# Patient Record
Sex: MALE | Race: WHITE | NOT HISPANIC OR LATINO
[De-identification: names, ages, dates, MRNs, and addresses within clinical notes are randomized per-mention and may not be internally consistent; named-entity substitution may affect disease eponyms.]

---

## 2020-05-13 ENCOUNTER — TRANSCRIPTION ENCOUNTER (OUTPATIENT)
Age: 25
End: 2020-05-13

## 2021-03-22 ENCOUNTER — TRANSCRIPTION ENCOUNTER (OUTPATIENT)
Age: 26
End: 2021-03-22

## 2021-12-17 PROBLEM — Z00.00 ENCOUNTER FOR PREVENTIVE HEALTH EXAMINATION: Status: ACTIVE | Noted: 2021-12-17

## 2021-12-22 ENCOUNTER — APPOINTMENT (OUTPATIENT)
Dept: NEUROLOGY | Facility: CLINIC | Age: 26
End: 2021-12-22
Payer: COMMERCIAL

## 2021-12-22 VITALS
OXYGEN SATURATION: 99 % | SYSTOLIC BLOOD PRESSURE: 124 MMHG | TEMPERATURE: 97.6 F | DIASTOLIC BLOOD PRESSURE: 84 MMHG | HEIGHT: 73 IN | WEIGHT: 183 LBS | HEART RATE: 81 BPM | BODY MASS INDEX: 24.25 KG/M2

## 2021-12-22 DIAGNOSIS — F98.8 OTHER SPECIFIED BEHAVIORAL AND EMOTIONAL DISORDERS WITH ONSET USUALLY OCCURRING IN CHILDHOOD AND ADOLESCENCE: ICD-10-CM

## 2021-12-22 DIAGNOSIS — F41.9 ANXIETY DISORDER, UNSPECIFIED: ICD-10-CM

## 2021-12-22 DIAGNOSIS — M21.339 WRIST DROP, UNSPECIFIED WRIST: ICD-10-CM

## 2021-12-22 DIAGNOSIS — Z72.89 OTHER PROBLEMS RELATED TO LIFESTYLE: ICD-10-CM

## 2021-12-22 DIAGNOSIS — F17.290 NICOTINE DEPENDENCE, OTHER TOBACCO PRODUCT, UNCOMPLICATED: ICD-10-CM

## 2021-12-22 PROCEDURE — 99204 OFFICE O/P NEW MOD 45 MIN: CPT

## 2021-12-22 RX ORDER — DEXTROAMPHETAMINE SACCHARATE, AMPHETAMINE ASPARTATE, DEXTROAMPHETAMINE SULFATE, AND AMPHETAMINE SULFATE 5; 5; 5; 5 MG/1; MG/1; MG/1; MG/1
20 TABLET ORAL
Refills: 0 | Status: ACTIVE | COMMUNITY

## 2021-12-22 RX ORDER — ESCITALOPRAM OXALATE 5 MG/1
5 TABLET, FILM COATED ORAL
Refills: 0 | Status: ACTIVE | COMMUNITY

## 2021-12-23 LAB
ALBUMIN SERPL ELPH-MCNC: 4.7 G/DL
ALP BLD-CCNC: 99 U/L
ALT SERPL-CCNC: 66 U/L
ANION GAP SERPL CALC-SCNC: 12 MMOL/L
AST SERPL-CCNC: 72 U/L
BASOPHILS # BLD AUTO: 0.06 K/UL
BASOPHILS NFR BLD AUTO: 1 %
BILIRUB SERPL-MCNC: <0.2 MG/DL
BUN SERPL-MCNC: 7 MG/DL
CALCIUM SERPL-MCNC: 9.2 MG/DL
CHLORIDE SERPL-SCNC: 103 MMOL/L
CO2 SERPL-SCNC: 25 MMOL/L
CREAT SERPL-MCNC: 0.8 MG/DL
EOSINOPHIL # BLD AUTO: 0.36 K/UL
EOSINOPHIL NFR BLD AUTO: 6.3 %
FOLATE SERPL-MCNC: 9.6 NG/ML
GLUCOSE SERPL-MCNC: 68 MG/DL
HCT VFR BLD CALC: 47.9 %
HGB BLD-MCNC: 15.4 G/DL
IMM GRANULOCYTES NFR BLD AUTO: 0.7 %
LYMPHOCYTES # BLD AUTO: 1.11 K/UL
LYMPHOCYTES NFR BLD AUTO: 19.3 %
MAN DIFF?: NORMAL
MCHC RBC-ENTMCNC: 32.2 GM/DL
MCHC RBC-ENTMCNC: 33.2 PG
MCV RBC AUTO: 103.2 FL
MONOCYTES # BLD AUTO: 0.68 K/UL
MONOCYTES NFR BLD AUTO: 11.8 %
NEUTROPHILS # BLD AUTO: 3.5 K/UL
NEUTROPHILS NFR BLD AUTO: 60.9 %
PLATELET # BLD AUTO: 272 K/UL
POTASSIUM SERPL-SCNC: 4.2 MMOL/L
PROT SERPL-MCNC: 7.1 G/DL
RBC # BLD: 4.64 M/UL
RBC # FLD: 12 %
SODIUM SERPL-SCNC: 141 MMOL/L
VIT B12 SERPL-MCNC: 495 PG/ML
WBC # FLD AUTO: 5.75 K/UL

## 2021-12-23 NOTE — PHYSICAL EXAM
[FreeTextEntry1] : Physical examination \par General: No acute distress, Awake, Alert. \par \par Mental status \par Awake, alert, and oriented to person, time and place, Normal attention span and concentration, Recent and remote memory intact, Language intact, Fund of knowledge intact. \par Cranial Nerves \par II: VFF \par III, IV, VI: PERRL, EOMI. \par V: Facial sensation is normal B/L. \par VII: Facial strength is normal B/L. \par VIII: Gross hearing is intact. \par IX, X: Palate is midline and elevates symmetrically. \par XI: Trapezius normal strength. \par XII: Tongue midline without atrophy or fasciculations. \par \par Motor exam \par Muscle tone - no evidence of rigidity or resistance in all 4 extremities. \par No atrophy or fasciculations \par Muscle Strength: arms and legs, proximal and distal flexors and extensors are normal EXCEPT right wrist extensor weakness, finger extensor weakness noted as well leading to dec  \par \par No UE drift.\par \par Reflexes \par All present, normal, and symmetrical. \par Plantars right: mute. \par Plantars left: mute. \par \par Coordination \par Finger to nose: Normal. \par Heel to shin: Normal. \par \par Sensory \par Intact sensation to vibration and cold.\par \par Gait \par Normal\par

## 2021-12-23 NOTE — ASSESSMENT
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

## 2021-12-23 NOTE — HISTORY OF PRESENT ILLNESS
[FreeTextEntry1] : This is a 25 y/o man who is being seen in consultation for right wrist drop and feet numbness.\par Drinks 4-6 beers 4-5 times a week.\par \par Mid-October woke up with right wrist drop\par Had been drinking and passed out on the chair\par Went to ED at [facility]. \par MRI Cervical spine and brachial plexus- normal.\par \par Regained some function - no pain\par didn't do therapy\par had it in a splint (given by ED)\par \par Also notes bilateral foot numbness.\par \par Started 6 months to one year ago\par New onset numbness in toes (right toe 4/5)- now involving entire foot \par Notes Left toe numbness\par No low back pain\par No loss of strength\par No balance issues\par No severe pain in feet\par lack of sensation but not painful\par \par Father has gluten sensitivity.

## 2021-12-24 LAB
TTG IGA SER IA-ACNC: <1.2 U/ML
TTG IGA SER-ACNC: NEGATIVE
TTG IGG SER IA-ACNC: <1.2 U/ML
TTG IGG SER IA-ACNC: NEGATIVE

## 2022-01-04 LAB — VIT B1 SERPL-MCNC: 130.2 NMOL/L

## 2022-04-28 ENCOUNTER — APPOINTMENT (OUTPATIENT)
Dept: NEUROLOGY | Facility: CLINIC | Age: 27
End: 2022-04-28
Payer: COMMERCIAL

## 2022-04-28 DIAGNOSIS — R94.131 ABNORMAL ELECTROMYOGRAM [EMG]: ICD-10-CM

## 2022-04-28 DIAGNOSIS — R20.0 ANESTHESIA OF SKIN: ICD-10-CM

## 2022-04-28 DIAGNOSIS — M54.16 RADICULOPATHY, LUMBAR REGION: ICD-10-CM

## 2022-04-28 PROCEDURE — 95910 NRV CNDJ TEST 7-8 STUDIES: CPT

## 2022-04-28 PROCEDURE — 95886 MUSC TEST DONE W/N TEST COMP: CPT

## 2022-04-28 PROCEDURE — 99214 OFFICE O/P EST MOD 30 MIN: CPT | Mod: 25

## 2022-04-28 NOTE — CONSULT LETTER
[Dear  ___] : Dear  [unfilled], [Consult Letter:] : I had the pleasure of evaluating your patient, [unfilled]. [Please see my note below.] : Please see my note below. [FreeTextEntry3] : Sincerely,\par \par Milton Guevara M.D.\par

## 2022-04-28 NOTE — ASSESSMENT
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 declined off to help with inpatient detox- wants to do on own.\par \par

## 2022-04-28 NOTE — HISTORY OF PRESENT ILLNESS
[FreeTextEntry1] : Doing well overall.\par Drinking the same amount.\par No low back pain, no neck pain.\par \par Right wrist back to normal.\par \par Recalls right foot fracture during covid- was in a boot -\par treated in  and orthopaedic surgery\par \par \par 12/22/21\par This is a 27 y/o man who is being seen in consultation for right wrist drop and feet numbness.\par Drinks 4-6 beers 4-5 times a week.\par \par Mid-October woke up with right wrist drop\par Had been drinking and passed out on the chair\par Went to ED at [facility]. \par MRI Cervical spine and brachial plexus- normal.\par \par Regained some function - no pain\par didn't do therapy\par had it in a splint (given by ED)\par \par Also notes bilateral foot numbness.\par \par Started 6 months to one year ago\par New onset numbness in toes (right toe 4/5)- now involving entire foot \par Notes Left toe numbness\par No low back pain\par No loss of strength\par No balance issues\par No severe pain in feet\par lack of sensation but not painful\par \par Father has gluten sensitivity.

## 2022-04-28 NOTE — HISTORY OF PRESENT ILLNESS
[FreeTextEntry1] : Doing well overall.\par Drinking the same amount.\par No low back pain, no neck pain.\par \par Right wrist back to normal.\par \par Recalls right foot fracture during covid- was in a boot -\par treated in  and orthopaedic surgery\par \par \par 12/22/21\par This is a 27 y/o man who is being seen in consultation for right wrist drop and feet numbness.\par Drinks 4-6 beers 4-5 times a week.\par \par Mid-October woke up with right wrist drop\par Had been drinking and passed out on the chair\par Went to ED at [facility]. \par MRI Cervical spine and brachial plexus- normal.\par \par Regained some function - no pain\par didn't do therapy\par had it in a splint (given by ED)\par \par Also notes bilateral foot numbness.\par \par Started 6 months to one year ago\par New onset numbness in toes (right toe 4/5)- now involving entire foot \par Notes Left toe numbness\par No low back pain\par No loss of strength\par No balance issues\par No severe pain in feet\par lack of sensation but not painful\par \par Father has gluten sensitivity.

## 2022-05-26 ENCOUNTER — RESULT REVIEW (OUTPATIENT)
Age: 27
End: 2022-05-26

## 2022-06-05 ENCOUNTER — NON-APPOINTMENT (OUTPATIENT)
Age: 27
End: 2022-06-05

## 2022-06-13 ENCOUNTER — NON-APPOINTMENT (OUTPATIENT)
Age: 27
End: 2022-06-13

## 2022-06-13 ENCOUNTER — APPOINTMENT (OUTPATIENT)
Dept: NEUROSURGERY | Facility: CLINIC | Age: 27
End: 2022-06-13
Payer: COMMERCIAL

## 2022-06-13 VITALS
HEIGHT: 73 IN | HEART RATE: 94 BPM | SYSTOLIC BLOOD PRESSURE: 120 MMHG | DIASTOLIC BLOOD PRESSURE: 93 MMHG | TEMPERATURE: 98.7 F | OXYGEN SATURATION: 98 % | BODY MASS INDEX: 24.52 KG/M2 | WEIGHT: 185 LBS

## 2022-06-13 DIAGNOSIS — R93.89 ABNORMAL FINDINGS ON DIAGNOSTIC IMAGING OF OTHER SPECIFIED BODY STRUCTURES: ICD-10-CM

## 2022-06-13 PROCEDURE — 99205 OFFICE O/P NEW HI 60 MIN: CPT

## 2022-11-16 NOTE — END OF VISIT
[Time Spent: ___ minutes] : I have spent [unfilled] minutes of time on the encounter. [>50% of the face to face encounter time was spent on counseling and/or coordination of care for ___] : Greater than 50% of the face to face encounter time was spent on counseling and/or coordination of care for [unfilled] [FreeTextEntry3] : I have seen the patient and reviewed the case together with PA and I agree with the final recommendations and plan of care.\par \par Drake Minor MD\par Neurosurgery\par \par

## 2022-11-16 NOTE — PHYSICAL EXAM
[Sensation Tactile Decrease] : light touch was intact [Past-pointing] : there was no past-pointing [Tremor] : no tremor present [FreeTextEntry7] : dec sensation bilat feet

## 2022-11-16 NOTE — HISTORY OF PRESENT ILLNESS
[de-identified] : CAROLINA DORSEY is a 26 year male with a PMH of EtOH use, Anxiety, ADD, recent R radial wrist drop (resolved) who presents to the office today for neurosurgical consultation at the request of JANUSZ Holly due to left frontal cystic brain lesion found on recent outpatient work up of bilateral foot numbness.  The patient reports bilateral foot numbness that started about 1 year ago.  He denies headaches, weakness, numbness, gait abnormality, imbalance, visual change, speech disturbance.  He underwent MRI L spine which was unremarkable and LE EMG which showed prolonged decreased peroneal nerve response (prior foot fracture).  MRI brain was done which showed small 0.8cm left frontal operculum non-enhancing cystic lesion. MRI Cspine/brachial plexus-normal. B12/Folate levels were normal. \par Meds: Adderal, lexapro\par Allergies: NKDA\par Soc Hx: drinks 4-5 drinks 5 days a week, +vaping, \par Surg Hx: rhinoplasty\par  \par

## 2022-11-16 NOTE — ASSESSMENT
[FreeTextEntry1] : I have discussed the natural history and treatment options for cystic brain lesions with the patient. I explained the indications for observation and imaging surveillance, medical management, and surgery. I discussed the risks, benefits, possible complications and expected outcome related to each treatment option.\par \par I am favoring arachnoid cyst as the most likely diagnosis given the imaging findings.  In the end, I recommend imaging surveillance in 6 month's time with MRI +/- brain Rogers protocol prior to visit to follow for progression of the lesion.  \par \par The patient understands the plan of care and is in agreement.  All questions answered to patient satisfaction.\par \par \par

## 2022-11-16 NOTE — DATA REVIEWED
[de-identified] : \par  [facility] MRI Report             Amended\par \par No Documents Attached\par \par \par \par \par   [facility]\par                                          [address]\par                                    [place], [place]  [address]\par                                        Department of Radiology\par                                             [phone]\par \par \par Patient Name:      CAROLINA DORSEY                Location:       [facility]\par Med Rec #:        [id number]                    Account #:      [id number]\par YOB: 1995                    Ordering:       Brigitte Guevara MD\par Age: 26               Sex:    M                 Attending:      Brigitte Guevara MD\par PCP:        Judd Souza MD\par ______________________________________________________________________________________\par \par Exam Date:      05/26/22\par Exam:         MRI BRAIN [facility] IC\par Order#:       MRI [id number]\par \par \par \par       **************A D D E N D U M**************\par Addendum: Following the administration of 7 cc of Gadavist, sagittal axial and coronal reformatted\par T1 MP rage images were obtained.\par \par  There is no evidence of abnormal enhancement within the brain parenchyma. No evidence of abnormal\par enhancement within the 0.8 cm cystic lesion with mild peripheral FLAIR and T2 signal hyperintensity\par in the left frontal operculum. Findings may represent an infectious/inflammatory process however\par primary cystic brain neoplasm such as low-grade glioma cannot be excluded. Recommend correlation\par with the patient's history and if clinically indicated follow-up MRI with contrast to assess for\par stability.\par \par  --- End of Report ---\par \par ***Electronically Signed ***\par -----------------------------------------------\par Kellie Gonzalez MD        06/01/22\par \par \par Signed on 06/01/22 1733\par Edited by: Kellie Gonzalez MD\par \par \par Bilateral numbness of the feet. Abnormal EMG.\par \par  Technique: MRI of the brain was performed utilizing sagittal, coronal and axial reformatted T1 MP\par rage, axial T2, axial gradient echo, axial, sagittal and coronal reformatted 3-D FLAIR and diffusion\par imaging.\par \par  There are no prior studies available for comparison.\par \par  Findings: There is an abnormal ovoid focus of T1 and FLAIR signal hypointensity and T2 signal\par hyperintensity measuring approximately 0.8 cm x 0.6 cm x 0.5 cm cranial caudal within the left\par inferior frontal opercular cortical/subcortical white matter (image #15 series 30). There is\par peripheral surrounding T2 and FLAIR signal hyperintensity.\par \par  There is no evidence of mass-effect or midline shift. There is no evidence of  extra-axial fluid\par collection. The ventricles are of normal size and caliber. There is no evidence of acute infarction.\par Evaluation of the intracranial vascular flow-voids appear within normal limits.\par \par  Gradient echo images demonstrate no evidence of abnormal susceptibility changes.\par \par  The visualized paranasal sinuses and bilateral mastoid air cells are clear.\par \par \par  Impression: Approximately 0.8 cm abnormal ovoid cystic focus with mild peripheral surrounding T2\par and FLAIR signal hyperintensity likely edema within the left inferior frontal opercular\par cortical/subcortical white matter. Findings may represent an infectious/inflammatory process\par including neurocysticercosis however cystic neoplastic processes cannot be excluded. Recommend MRI\par with contrast for further characterization. No evidence of acute infarction.\par \par  --- End of Report ---\par \par ***Electronically Signed ***\par -----------------------------------------------\par Kellie Gonzalez MD              05/26/22 1637\par \par Dictated on 05/26/22\par \par \par Report cc:  Brigitte Guevara MD;\par \par  \par \par  Ordered by: BRIGITTE GUEVARA       Collected/Examined: 26May2022 07:25AM       \par Verification Required       Stage: Amended       \par  Performed at: [facility]       Resulted: [id number] 04:37PM       Last Updated: 01Jun2022 05:37PM       Accession: [id number]

## 2022-12-16 ENCOUNTER — RESULT REVIEW (OUTPATIENT)
Age: 27
End: 2022-12-16

## 2024-01-29 ENCOUNTER — RESULT REVIEW (OUTPATIENT)
Age: 29
End: 2024-01-29

## 2024-02-07 ENCOUNTER — APPOINTMENT (OUTPATIENT)
Dept: NEUROSURGERY | Facility: CLINIC | Age: 29
End: 2024-02-07
Payer: COMMERCIAL

## 2024-02-07 VITALS
SYSTOLIC BLOOD PRESSURE: 130 MMHG | HEIGHT: 73 IN | DIASTOLIC BLOOD PRESSURE: 92 MMHG | HEART RATE: 93 BPM | OXYGEN SATURATION: 97 %

## 2024-02-07 DIAGNOSIS — G93.0 CEREBRAL CYSTS: ICD-10-CM

## 2024-02-07 PROCEDURE — 99215 OFFICE O/P EST HI 40 MIN: CPT

## 2024-02-07 NOTE — REASON FOR VISIT
[FreeTextEntry1] : Mr. Dorsey returns today for interval follow up and imaging review of his left frontal cyst noted on imaging work up of bilateral foot numbness.  The patient denies headaches, visual change, numbness, weakness of extremities, speech disturbance, dizziness, neck pain, vertigo.  MRI brain +/- performed on 1/29/24 and independently reviewed by me today reveals stability of 5 x 7 x 6mm T2 hyperintense, non enhancing left frontal cystic structure. Patient has no new complaints.    6/13/22: CAROLINA DORSEY is a 26 year male with a PMH of EtOH use, Anxiety, ADD, recent R radial wrist drop (resolved) who presents to the office today for neurosurgical consultation at the request of JANUSZ Holly due to left frontal cystic brain lesion found on recent outpatient work up of bilateral foot numbness. The patient reports bilateral foot numbness that started about 1 year ago. He denies headaches, weakness, numbness, gait abnormality, imbalance, visual change, speech disturbance. He underwent MRI L spine which was unremarkable and LE EMG which showed prolonged decreased peroneal nerve response (prior foot fracture). MRI brain was done which showed small 0.8cm left frontal operculum non-enhancing cystic lesion. MRI Cspine/brachial plexus-normal. B12/Folate levels were normal. Meds: Adderal, lexapro Allergies: NKDA Soc Hx: drinks 4-5 drinks 5 days a week, +vaping, Surg Hx: rhinoplasty

## 2024-02-07 NOTE — DATA REVIEWED
[de-identified] : Exam Date:      01/29/24 Exam:         MRI BRAIN [facility] Order#:       MRI [id number]    MRI BRAIN WITH AND WITHOUT IV CONTRAST   CLINICAL INFORMATION: Arachnoid cyst   TECHNIQUE: Magnetic resonance imaging was performed using multisequence, multiplanar technique with and without intravenous contrast.   COMPARISON: MRI brain 12/16/2022   FINDINGS:   BRAIN PARENCHYMA: There is no evidence of acute infarct or intracranial blood products. There is redemonstration of a 5 x 7 x 6 mm homogeneously T2 hyperintense nonenhancing structure in the left frontal subcortical white matter, adjacent to the left frontal operculum. Trace surrounding T2/FLAIR hyperintensity is suggested. Midline sagittal structures are unremarkable. No abnormal areas of enhancement are identified.   EXTRA-AXIAL COMPARTMENT, VENTRICLES, AND CISTERNS: There is no evidence of extra-axial collection or hemorrhage. There is no hydrocephalus. There is no cisternal effacement.   CALVARIUM, ORBITS, AND PARANASAL SINUSES: Sinuses demonstrate T2 hyperintense mucosal thickening along the floor of the left maxillary sinus. Calvarium is unremarkable. The orbits are within normal limits.    IMPRESSION:   Stable appearance of cystic lesion within the left frontal subcortical white matter as compared to the prior studies from 2022. Diagnostic considerations remain sequela of prior infectious/in flammatory process or atypical neuroglial cyst, with low-grade cystic neoplasm felt to be less likely given the benign features and stability over time.   No abnormal areas of enhancement identified. No evidence of acute ischemia.   --- End of Report ---  ***Electronically Signed *** ----------------------------------------------- Chelsie Collado MD              01/31/24 0300  Dictated on 01/31/24

## 2024-02-07 NOTE — END OF VISIT
[FreeTextEntry3] : I have seen the patient and reviewed the case together with PA and I agree with the final recommendations and plan of care.  Drake Minor MD Neurosurgery

## 2024-02-07 NOTE — ASSESSMENT
[FreeTextEntry1] : I have discussed the natural history and treatment options for cystic brain lesions with the patient. I explained the indications for observation and imaging surveillance, medical management, and surgery. I discussed the risks, benefits, possible complications and expected outcome related to each treatment option.  I am favoring arachnoid cyst as the most likely diagnosis given the imaging findings. In the end, I recommend imaging surveillance in two years' time with MRI +/- brain Keegan protocol prior to visit to follow for progression of the lesion.

## 2024-12-25 PROBLEM — F10.90 ALCOHOL USE: Status: ACTIVE | Noted: 2021-12-22
